# Patient Record
Sex: FEMALE | Race: WHITE | NOT HISPANIC OR LATINO | URBAN - METROPOLITAN AREA
[De-identification: names, ages, dates, MRNs, and addresses within clinical notes are randomized per-mention and may not be internally consistent; named-entity substitution may affect disease eponyms.]

---

## 2020-10-29 ENCOUNTER — OFFICE VISIT - RIVER FALLS (OUTPATIENT)
Dept: FAMILY MEDICINE | Facility: CLINIC | Age: 55
End: 2020-10-29

## 2020-10-29 ASSESSMENT — MIFFLIN-ST. JEOR: SCORE: 1371.27

## 2022-02-12 VITALS — BODY MASS INDEX: 32.43 KG/M2 | HEIGHT: 63 IN | WEIGHT: 183 LBS

## 2022-02-15 NOTE — LETTER
(Inserted Image. Unable to display)   November 05, 2021  TIFFANY AMADOR  [address] [place] [id number]        Dear TIFFANY,    Thank you for selecting [facility] for your healthcare needs.    Your Healthcare Provider has recommended that you schedule a diabetes management appointment with our Certified Diabetes Educator, Sabrina Wolff RD, CD, CDE.     Please bring your glucose meter and your blood glucose diary to your appointment.    Available services include:  - Education about diabetes with guidance and support   - Education on the 7 Self Care Behaviors for Diabetes Management:     Healthy Eating   portion control, label readings, carbohydrate recommendations, heart healthy guidelines, meal planning    Being Active - Physical activity guidelines     Monitoring   blood glucose targets, evaluation of blood glucose readings and lab results    Taking Medication   medication management    Problem Solving   discuss any concerns/ questions     Healthy Coping   disease progression and management    Reducing Risks   prevention strategies to help avoid potential complications related to uncontrolled diabetes  - Weight loss strategies      (FYI   Regarding office visit: In some instances, a video visit may be offered as an option.)    To schedule an appointment or if you have further questions, please contact your clinic at [phone].    Powered by [employer]    Sincerely,    Sabrina Wolff RD, CD, CDE

## 2022-02-15 NOTE — NURSING NOTE
Quick Intake Entered On:  10/29/2020 5:39 PM CDT    Performed On:  10/29/2020 5:38 PM CDT by Sabrina Wolff               Summary   Weight Measured :   183 lb(Converted to: 183 lb 0 oz, 83.007 kg)    Height Measured :   62.5 in(Converted to: 5 ft 2 in, 158.75 cm)    Body Mass Index :   32.93 kg/m2 (HI)    Body Surface Area :   1.91 m2   Sabrina Wolff - 10/29/2020 5:38 PM CDT

## 2022-02-15 NOTE — PROGRESS NOTES
Patient:   TIFFANY AMADOR            MRN: [id number]            FIN: [id number]               Age:   55 years     Sex:  Female     :  1965   Associated Diagnoses:   Type 2 diabetes mellitus without complications; Obesity, unspecified   Author:   Sabrina Wolff      Visit Information   Visit type:  Diabetes Self Management Education .    Referral source:  Evelia LIEBERMAN, Jenny, Dr[facility] .       Chief Complaint   DM Type II, Obesity       History of Present Illness   Pt has had Type II diabetes since 2019 and has not seen an educator since dx.  Pt did go to a CDE with dx of prediabetes many years ago.    Pt comes today for an update and ongoing education for diabetes management as hgb a1c is trending up but still controlled with metformin.  Pt would like to prevent further progression.      Discussed nutrition, diabetes, and lifestyle management with pt  Nutrition:  Does not specifically follow a carb controlled meal plan, does work from home now and is not eating out.  More home cooked meals and leftovers.  Pt does well with raw vegetables and some fruit, downfall is sugary candy.    Fluids: water  Physical Activity: takes the dog for a walk     Stress:   low   Sleep: good  Support:    BG Testing: using 's old meter once in a while, will provide new meter today   DM related medication: metformin 1000mg BID - tolerating well and taking as directed   Routine diabetes care:    Dilated Retinal Eye Exam: yearly eye exam    Skin: intact  Dental: goes 2x/ year  Feet: monofilament test completed with MD   Immunizations: influenza 10/16/2020 and pneumonia 2015 vaccines completed   Hgb A1c: 6.1% = 129 mg/dL estimated average glucose; 5.9% previous result       Health Status   Allergies:    Allergic Reactions (Selected)  No Known Medication Allergies   Medications:    Medications          No medications documented     Problem list:    All Problems  Allergic rhinitis, unspecified /  SNOMED CT 650014820 / Confirmed  Gastro-esophageal reflux disease without esophagitis / SNOMED CT 0323057378 / Confirmed  Personal history of malignant neoplasm of breast / SNOMED CT 2669809275 / Confirmed  Obesity, unspecified / SNOMED CT 6176836134 / Confirmed  Type 2 diabetes mellitus without complications / SNOMED CT 530427461 / Confirmed      Histories   Past Medical History:    Active  Type 2 diabetes mellitus without complications (197761014)  Obesity, unspecified (4911080579)  Allergic rhinitis, unspecified (273239865)  Gastro-esophageal reflux disease without esophagitis (0949722461)  Personal history of malignant neoplasm of breast (1516849231)   Family History:    Breast cancer  Sister  Sister  Hypertension  Sister  Heart disease  Grandmother (M)  Mother  [place] - Cancer  Father     Procedure history:    Partial mastectomy of left breast (SNOMED CT 1151712529) on 11/12/2019 at 54 Years.  Mammogram (SNOMED CT 556620052) on 10/10/2019 at 54 Years.  Colonoscopy (SNOMED CT 336091561) on 5/18/2015 at 49 Years.  Extraction of wisdom tooth (SNOMED CT 466068939).  Biopsy of lymph node (SNOMED CT 76215650).  Comments:  10/26/2020 1:32 PM CDT - Vera Eden  Left axillary.   Social History:        Alcohol Assessment            1-2 times per year      Tobacco Assessment: Denies Tobacco Use            Never (less than 100 in lifetime)      Employment and Education Assessment            Work/School description: .      Home and Environment Assessment            Marital status: .  Spouse/Partner name: Jose.      Nutrition and Health Assessment            Type of diet: Regular.      Exercise and Physical Activity Assessment            Ex[facility] type[facility]      Sexual Assessment            Sexually active: Yes.  Sexual orientation: Straight or heterosexual.        Physical Examination   Measurements from flowsheet : Measurements   10/29/2020 5:38 PM CDT Height Measured - Standard  62.5 in    Weight Measured - Standard 183 lb    BSA 1.91 m2    Body Mass Index 32.93 kg/m2  HI         Impression and Plan   Diagnosis     Type 2 diabetes mellitus without complications (FQM35-FK E11.9).     Obesity, unspecified (HVB16-UB E66.9).       Counseled: Patient, WILLIAM Self Care Behaviors   Today the patient was instructed on the basic physiology of diabetes, BG management, prevention of heart disease, prevention of complications, and lifestyle guidelines.  Healthy Eating - Carbohydrate counting, reading food labels, appropriate portion sizes, well balanced meals, diabetic plate method as a general rule.  Patient is provided with numerous ideas to incorporate dietary recommendations, meal planning and preparation, mindful eating techniques and weight loss tips.    Instructed on Therapeutic Lifestyle Changes (TLC) nutrition plan for heart healthy eating.    Vegetarian at least 1 day per week  Being Active - Education on how exercise helps with :    - lowering BG by increasing the muscles ability to take up and use glucose   - weight loss   - healthier heart (improve lipid profile)   - improve sleep, mood, energy   - decrease stress  Monitoring - Today the patient is instructed on home blood glucose monitoring.  Pt is provided with a meter.  Pt is instructed on the proper use of the meter, recommended testing schedule and blood glucose target levels.  The patient is able to return appropriate demonstration of the use of the meter.  Pt is instructed on proper disposal of lancets.     Taking Medication - Not recommended at this time.  Discussed the progression of diabetes and potential of needing medication in the future.    Problem Solving - medical support team assistance, resources provided (written and apps, internet); good control of stress  Healthy Coping - support of friends, family, and medical support team   Reducing Risks - Education on importance of good glucose control to help reduce the potential  for developing microvascular and macrovascular complications associated with high blood glucose over time.    Education on the following complications    *heart attack/cardiovascular disease - prevention with heart healthy diet, daily activity, and weight control   *retinopathy - annual eye exam   *nephropathy - discussion on annual or prn kidney function labs with urinalysis    *stroke - maintaining recommended glucose control   *peripheral arterial disease - regular activity, maintaining recommended glucose control   *neuropathy - monofilament testing, regular activity, maintaining recommended glucose control  Appropriate foot care education  Vaccinations as recommended influenza, pneumonia etc.   Routine dental appt.'s for prevention of periodontal diseases   Importance of adequate sleep   Good skin care, monitor for any open areas, sores, or cuts.      Pt able to verbalize understanding of above education, handouts provided for additional resources.      Goals:   1.  Practice healthy stress management and get good quality sleep with the goal of 7-8 hours per night.    2.   Physical activity: Recommend increasing to 2 hrs and 30 min a week (150 minutes) of moderate-intensity, or 1 hr and 15 min (75 minutes) a week of vigorous-intensity aerobic physical activity, or an equivalent combination of moderate- and vigorous-intensity aerobic physical activity.  Aerobic activity should be performed in episodes of at least 10 minutes, preferably spread throughout the week.  Muscle-strengthening activities on 2 or more days per week.    3.  Eat in a healthy way, per diabetic plate method.  Nutrition reference: Eat 3 meals a day; snacks are optional.  A meal is three or more food groups; make it colorful for better nutrition.  Follow TLC dietary heart healthy guidelines  4.  Total Carbohydrate intake 30 grams for meals, snacks ~ 15 grams  5.  Pt to monitor BG BID a few x/ month.  BG goals: Fasting and before meals 80 - 120  mg/dL, 2 hrs after the start of a meal 80 - 140 mg/dL; max 180mg/dL at any point.    6.  Goal weight 165 by 6/2021  7.  Read handouts provided.  F/u yearly and prn  8.  a1c goal <6.5%       Professional Services   Time spent with pt 45min   cc Dr. Altamirano   cc Dr. Magdaleno Deleon [facility]